# Patient Record
Sex: MALE | Race: WHITE | Employment: FULL TIME | URBAN - METROPOLITAN AREA
[De-identification: names, ages, dates, MRNs, and addresses within clinical notes are randomized per-mention and may not be internally consistent; named-entity substitution may affect disease eponyms.]

---

## 2018-03-05 ENCOUNTER — APPOINTMENT (OUTPATIENT)
Dept: CT IMAGING | Age: 63
DRG: 072 | End: 2018-03-05
Payer: COMMERCIAL

## 2018-03-05 ENCOUNTER — APPOINTMENT (OUTPATIENT)
Dept: MRI IMAGING | Age: 63
DRG: 072 | End: 2018-03-05
Payer: COMMERCIAL

## 2018-03-05 ENCOUNTER — HOSPITAL ENCOUNTER (INPATIENT)
Age: 63
LOS: 1 days | Discharge: HOME | DRG: 072 | End: 2018-03-06
Attending: EMERGENCY MEDICINE | Admitting: FAMILY MEDICINE
Payer: COMMERCIAL

## 2018-03-05 DIAGNOSIS — R41.2 AMNESIA (RETROGRADE): Primary | ICD-10-CM

## 2018-03-05 PROBLEM — G45.9 TIA (TRANSIENT ISCHEMIC ATTACK): Status: ACTIVE | Noted: 2018-03-05

## 2018-03-05 LAB
EKG ATRIAL RATE: 62 BPM
EKG P AXIS: 57 DEGREES
EKG P-R INTERVAL: 134 MS
EKG Q-T INTERVAL: 392 MS
EKG QRS DURATION: 100 MS
EKG QTC CALCULATION (BAZETT): 397 MS
EKG R AXIS: 32 DEGREES
EKG T AXIS: 19 DEGREES
EKG VENTRICULAR RATE: 62 BPM
TROPONIN INTERP: NORMAL
TROPONIN T: <0.03 NG/ML

## 2018-03-05 PROCEDURE — 70551 MRI BRAIN STEM W/O DYE: CPT

## 2018-03-05 PROCEDURE — 99254 IP/OBS CNSLTJ NEW/EST MOD 60: CPT | Performed by: PSYCHIATRY & NEUROLOGY

## 2018-03-05 PROCEDURE — 6360000004 HC RX CONTRAST MEDICATION: Performed by: EMERGENCY MEDICINE

## 2018-03-05 PROCEDURE — 84484 ASSAY OF TROPONIN QUANT: CPT

## 2018-03-05 PROCEDURE — 99285 EMERGENCY DEPT VISIT HI MDM: CPT

## 2018-03-05 PROCEDURE — 70498 CT ANGIOGRAPHY NECK: CPT

## 2018-03-05 PROCEDURE — 93005 ELECTROCARDIOGRAM TRACING: CPT

## 2018-03-05 PROCEDURE — 70496 CT ANGIOGRAPHY HEAD: CPT

## 2018-03-05 PROCEDURE — 2060000000 HC ICU INTERMEDIATE R&B

## 2018-03-05 RX ORDER — ASPIRIN 81 MG/1
81 TABLET ORAL DAILY
Status: DISCONTINUED | OUTPATIENT
Start: 2018-03-06 | End: 2018-03-06 | Stop reason: HOSPADM

## 2018-03-05 RX ORDER — ACETAMINOPHEN 325 MG/1
650 TABLET ORAL EVERY 4 HOURS PRN
Status: DISCONTINUED | OUTPATIENT
Start: 2018-03-05 | End: 2018-03-06 | Stop reason: HOSPADM

## 2018-03-05 RX ORDER — SODIUM CHLORIDE 0.9 % (FLUSH) 0.9 %
10 SYRINGE (ML) INJECTION EVERY 12 HOURS SCHEDULED
Status: DISCONTINUED | OUTPATIENT
Start: 2018-03-06 | End: 2018-03-06 | Stop reason: HOSPADM

## 2018-03-05 RX ORDER — BISACODYL 10 MG
10 SUPPOSITORY, RECTAL RECTAL DAILY PRN
Status: DISCONTINUED | OUTPATIENT
Start: 2018-03-05 | End: 2018-03-06 | Stop reason: HOSPADM

## 2018-03-05 RX ORDER — NICOTINE 21 MG/24HR
1 PATCH, TRANSDERMAL 24 HOURS TRANSDERMAL DAILY
Status: DISCONTINUED | OUTPATIENT
Start: 2018-03-06 | End: 2018-03-06 | Stop reason: HOSPADM

## 2018-03-05 RX ORDER — ASPIRIN 81 MG/1
324 TABLET, CHEWABLE ORAL ONCE
Status: COMPLETED | OUTPATIENT
Start: 2018-03-05 | End: 2018-03-06

## 2018-03-05 RX ORDER — ACETAMINOPHEN 650 MG/1
650 SUPPOSITORY RECTAL EVERY 4 HOURS PRN
Status: DISCONTINUED | OUTPATIENT
Start: 2018-03-05 | End: 2018-03-06 | Stop reason: HOSPADM

## 2018-03-05 RX ORDER — ONDANSETRON 2 MG/ML
4 INJECTION INTRAMUSCULAR; INTRAVENOUS EVERY 6 HOURS PRN
Status: DISCONTINUED | OUTPATIENT
Start: 2018-03-05 | End: 2018-03-06 | Stop reason: HOSPADM

## 2018-03-05 RX ORDER — HYDROCODONE BITARTRATE AND ACETAMINOPHEN 5; 325 MG/1; MG/1
1 TABLET ORAL EVERY 4 HOURS PRN
Status: DISCONTINUED | OUTPATIENT
Start: 2018-03-05 | End: 2018-03-06 | Stop reason: HOSPADM

## 2018-03-05 RX ORDER — ASPIRIN 300 MG/1
300 SUPPOSITORY RECTAL DAILY
Status: DISCONTINUED | OUTPATIENT
Start: 2018-03-06 | End: 2018-03-06 | Stop reason: HOSPADM

## 2018-03-05 RX ORDER — SODIUM CHLORIDE 0.9 % (FLUSH) 0.9 %
10 SYRINGE (ML) INJECTION PRN
Status: DISCONTINUED | OUTPATIENT
Start: 2018-03-05 | End: 2018-03-06 | Stop reason: HOSPADM

## 2018-03-05 RX ORDER — SODIUM CHLORIDE 9 MG/ML
INJECTION, SOLUTION INTRAVENOUS CONTINUOUS
Status: DISCONTINUED | OUTPATIENT
Start: 2018-03-06 | End: 2018-03-06 | Stop reason: HOSPADM

## 2018-03-05 RX ORDER — SIMVASTATIN 20 MG
20 TABLET ORAL NIGHTLY
Status: DISCONTINUED | OUTPATIENT
Start: 2018-03-06 | End: 2018-03-06 | Stop reason: HOSPADM

## 2018-03-05 RX ORDER — MORPHINE SULFATE 2 MG/ML
2 INJECTION, SOLUTION INTRAMUSCULAR; INTRAVENOUS
Status: DISCONTINUED | OUTPATIENT
Start: 2018-03-05 | End: 2018-03-06 | Stop reason: HOSPADM

## 2018-03-05 RX ORDER — MORPHINE SULFATE 4 MG/ML
4 INJECTION, SOLUTION INTRAMUSCULAR; INTRAVENOUS
Status: DISCONTINUED | OUTPATIENT
Start: 2018-03-05 | End: 2018-03-06 | Stop reason: HOSPADM

## 2018-03-05 RX ADMIN — IOPAMIDOL 90 ML: 755 INJECTION, SOLUTION INTRAVENOUS at 22:45

## 2018-03-06 VITALS
SYSTOLIC BLOOD PRESSURE: 119 MMHG | WEIGHT: 192.4 LBS | HEART RATE: 66 BPM | RESPIRATION RATE: 17 BRPM | OXYGEN SATURATION: 98 % | BODY MASS INDEX: 26.94 KG/M2 | HEIGHT: 71 IN | DIASTOLIC BLOOD PRESSURE: 67 MMHG | TEMPERATURE: 98.1 F

## 2018-03-06 PROBLEM — G45.4 TRANSIENT GLOBAL AMNESIA: Status: ACTIVE | Noted: 2018-03-05

## 2018-03-06 LAB
ALBUMIN SERPL-MCNC: 3.7 G/DL (ref 3.5–5.2)
ALBUMIN/GLOBULIN RATIO: 1.6 (ref 1–2.5)
ALP BLD-CCNC: 55 U/L (ref 40–129)
ALT SERPL-CCNC: 15 U/L (ref 5–41)
ANION GAP SERPL CALCULATED.3IONS-SCNC: 13 MMOL/L (ref 9–17)
AST SERPL-CCNC: 15 U/L
BILIRUB SERPL-MCNC: 1.09 MG/DL (ref 0.3–1.2)
BUN BLDV-MCNC: 15 MG/DL (ref 8–23)
BUN/CREAT BLD: ABNORMAL (ref 9–20)
CALCIUM SERPL-MCNC: 8.6 MG/DL (ref 8.6–10.4)
CHLORIDE BLD-SCNC: 105 MMOL/L (ref 98–107)
CHOLESTEROL/HDL RATIO: 4.2
CHOLESTEROL: 160 MG/DL
CO2: 24 MMOL/L (ref 20–31)
CREAT SERPL-MCNC: 0.71 MG/DL (ref 0.7–1.2)
ESTIMATED AVERAGE GLUCOSE: 103 MG/DL
GFR AFRICAN AMERICAN: >60 ML/MIN
GFR NON-AFRICAN AMERICAN: >60 ML/MIN
GFR SERPL CREATININE-BSD FRML MDRD: ABNORMAL ML/MIN/{1.73_M2}
GFR SERPL CREATININE-BSD FRML MDRD: ABNORMAL ML/MIN/{1.73_M2}
GLUCOSE BLD-MCNC: 81 MG/DL (ref 70–99)
HBA1C MFR BLD: 5.2 % (ref 4–6)
HCT VFR BLD CALC: 40.8 % (ref 40.7–50.3)
HDLC SERPL-MCNC: 38 MG/DL
HEMOGLOBIN: 13.7 G/DL (ref 13–17)
LDL CHOLESTEROL: 109 MG/DL (ref 0–130)
LV EF: 55 %
LVEF MODALITY: NORMAL
MCH RBC QN AUTO: 30.6 PG (ref 25.2–33.5)
MCHC RBC AUTO-ENTMCNC: 33.6 G/DL (ref 28.4–34.8)
MCV RBC AUTO: 91.3 FL (ref 82.6–102.9)
NRBC AUTOMATED: 0 PER 100 WBC
PDW BLD-RTO: 13.8 % (ref 11.8–14.4)
PLATELET # BLD: 135 K/UL (ref 138–453)
PMV BLD AUTO: 10.1 FL (ref 8.1–13.5)
POTASSIUM SERPL-SCNC: 3.9 MMOL/L (ref 3.7–5.3)
RBC # BLD: 4.47 M/UL (ref 4.21–5.77)
SODIUM BLD-SCNC: 142 MMOL/L (ref 135–144)
TOTAL PROTEIN: 6 G/DL (ref 6.4–8.3)
TRIGL SERPL-MCNC: 67 MG/DL
VLDLC SERPL CALC-MCNC: ABNORMAL MG/DL (ref 1–30)
WBC # BLD: 6.5 K/UL (ref 3.5–11.3)

## 2018-03-06 PROCEDURE — 97162 PT EVAL MOD COMPLEX 30 MIN: CPT

## 2018-03-06 PROCEDURE — 99254 IP/OBS CNSLTJ NEW/EST MOD 60: CPT | Performed by: PSYCHIATRY & NEUROLOGY

## 2018-03-06 PROCEDURE — 6370000000 HC RX 637 (ALT 250 FOR IP): Performed by: EMERGENCY MEDICINE

## 2018-03-06 PROCEDURE — 85027 COMPLETE CBC AUTOMATED: CPT

## 2018-03-06 PROCEDURE — 80053 COMPREHEN METABOLIC PANEL: CPT

## 2018-03-06 PROCEDURE — 97165 OT EVAL LOW COMPLEX 30 MIN: CPT

## 2018-03-06 PROCEDURE — 2580000003 HC RX 258: Performed by: NURSE PRACTITIONER

## 2018-03-06 PROCEDURE — 6360000002 HC RX W HCPCS: Performed by: NURSE PRACTITIONER

## 2018-03-06 PROCEDURE — 94762 N-INVAS EAR/PLS OXIMTRY CONT: CPT

## 2018-03-06 PROCEDURE — 95819 EEG AWAKE AND ASLEEP: CPT

## 2018-03-06 PROCEDURE — 97535 SELF CARE MNGMENT TRAINING: CPT

## 2018-03-06 PROCEDURE — G8987 SELF CARE CURRENT STATUS: HCPCS

## 2018-03-06 PROCEDURE — 97530 THERAPEUTIC ACTIVITIES: CPT

## 2018-03-06 PROCEDURE — G8979 MOBILITY GOAL STATUS: HCPCS

## 2018-03-06 PROCEDURE — 80061 LIPID PANEL: CPT

## 2018-03-06 PROCEDURE — 36415 COLL VENOUS BLD VENIPUNCTURE: CPT

## 2018-03-06 PROCEDURE — 95819 EEG AWAKE AND ASLEEP: CPT | Performed by: PSYCHIATRY & NEUROLOGY

## 2018-03-06 PROCEDURE — G8980 MOBILITY D/C STATUS: HCPCS

## 2018-03-06 PROCEDURE — 6370000000 HC RX 637 (ALT 250 FOR IP): Performed by: NURSE PRACTITIONER

## 2018-03-06 PROCEDURE — 93306 TTE W/DOPPLER COMPLETE: CPT

## 2018-03-06 PROCEDURE — G8988 SELF CARE GOAL STATUS: HCPCS

## 2018-03-06 PROCEDURE — 83036 HEMOGLOBIN GLYCOSYLATED A1C: CPT

## 2018-03-06 PROCEDURE — 99222 1ST HOSP IP/OBS MODERATE 55: CPT | Performed by: FAMILY MEDICINE

## 2018-03-06 PROCEDURE — G8978 MOBILITY CURRENT STATUS: HCPCS

## 2018-03-06 PROCEDURE — G8989 SELF CARE D/C STATUS: HCPCS

## 2018-03-06 RX ORDER — ASPIRIN 81 MG/1
81 TABLET ORAL DAILY
Qty: 30 TABLET | Refills: 3 | Status: SHIPPED | OUTPATIENT
Start: 2018-03-07

## 2018-03-06 RX ORDER — SIMVASTATIN 20 MG
20 TABLET ORAL NIGHTLY
Qty: 30 TABLET | Refills: 3 | Status: SHIPPED | OUTPATIENT
Start: 2018-03-06

## 2018-03-06 RX ADMIN — ENOXAPARIN SODIUM 40 MG: 100 INJECTION SUBCUTANEOUS at 08:39

## 2018-03-06 RX ADMIN — Medication 81 MG: at 08:39

## 2018-03-06 RX ADMIN — SODIUM CHLORIDE: 9 INJECTION, SOLUTION INTRAVENOUS at 01:20

## 2018-03-06 RX ADMIN — ASPIRIN 81 MG 324 MG: 81 TABLET ORAL at 01:18

## 2018-03-06 RX ADMIN — SIMVASTATIN 20 MG: 20 TABLET, FILM COATED ORAL at 01:17

## 2018-03-06 ASSESSMENT — ENCOUNTER SYMPTOMS
WHEEZING: 0
ABDOMINAL PAIN: 0
BACK PAIN: 0
NAUSEA: 0
CONSTIPATION: 0
SHORTNESS OF BREATH: 0
DIARRHEA: 0
COLOR CHANGE: 0
BLOOD IN STOOL: 0
VOICE CHANGE: 0
VOMITING: 0
COUGH: 0
CHEST TIGHTNESS: 0
SINUS PRESSURE: 0
SORE THROAT: 0

## 2018-03-06 NOTE — H&P
reports that he does not use drugs. Family History:     History reviewed. No pertinent family history. Review of Systems:     Positive and Negative as described in HPI. Review of Systems   Constitutional: Negative for activity change, appetite change, chills, fatigue, fever and unexpected weight change. HENT: Negative for congestion, mouth sores, postnasal drip, sinus pressure, sore throat and voice change. Eyes: Negative for visual disturbance. Respiratory: Negative for cough, shortness of breath and wheezing. Cardiovascular: Negative for chest pain and palpitations. Gastrointestinal: Negative for abdominal pain, blood in stool, constipation, diarrhea, nausea and vomiting. Endocrine: Negative for polyuria. Genitourinary: Negative for difficulty urinating, dysuria, frequency and urgency. Musculoskeletal: Negative for arthralgias, joint swelling and myalgias. Neurological: Negative for dizziness, tremors, speech difficulty, light-headedness and headaches. Physical Exam:   /78   Pulse 60   Temp 98 °F (36.7 °C) (Oral)   Resp 17   Ht 5' 11\" (1.803 m)   Wt 192 lb 6.4 oz (87.3 kg)   SpO2 97%   BMI 26.83 kg/m²   Temp (24hrs), Av.9 °F (36.6 °C), Min:97.6 °F (36.4 °C), Max:98.5 °F (36.9 °C)    No results for input(s): POCGLU in the last 72 hours. Intake/Output Summary (Last 24 hours) at 18 1147  Last data filed at 18 1974   Gross per 24 hour   Intake            822.6 ml   Output                0 ml   Net            822.6 ml     Physical Exam   Constitutional: He is oriented to person, place, and time. He appears well-developed and well-nourished. No distress. HENT:   Mouth/Throat: Oropharynx is clear and moist. No oropharyngeal exudate. Eyes: Pupils are equal, round, and reactive to light. No scleral icterus. Neck: Neck supple. No JVD present. No tracheal deviation present. No thyromegaly present.    Cardiovascular: Normal rate, regular rhythm, normal >60 >60 mL/min    GFR Comment          GFR Staging NOT REPORTED    Hemoglobin A1c    Collection Time: 03/06/18  6:21 AM   Result Value Ref Range    Hemoglobin A1C 5.2 4.0 - 6.0 %    Estimated Avg Glucose 103 mg/dL   Lipid panel - fasting    Collection Time: 03/06/18  6:21 AM   Result Value Ref Range    Cholesterol 160 <200 mg/dL    HDL 38 (L) >40 mg/dL    LDL Cholesterol 109 0 - 130 mg/dL    Chol/HDL Ratio 4.2 <5    Triglycerides 67 <150 mg/dL    VLDL NOT REPORTED 1 - 30 mg/dL   CBC    Collection Time: 03/06/18  6:21 AM   Result Value Ref Range    WBC 6.5 3.5 - 11.3 k/uL    RBC 4.47 4.21 - 5.77 m/uL    Hemoglobin 13.7 13.0 - 17.0 g/dL    Hematocrit 40.8 40.7 - 50.3 %    MCV 91.3 82.6 - 102.9 fL    MCH 30.6 25.2 - 33.5 pg    MCHC 33.6 28.4 - 34.8 g/dL    RDW 13.8 11.8 - 14.4 %    Platelets 250 (L) 000 - 453 k/uL    MPV 10.1 8.1 - 13.5 fL    NRBC Automated 0.0 0.0 per 100 WBC       Imaging/Diagonstics:    MRI brain 3/5/18   Impression:    Normal MRI of the brain without contrast.    CTA head and neck 3/5/18 Impression: Atherosclerotic changes without hemodynamically significant stenosis in the  major cervical arterial vasculature. Patent intracranial arterial vasculature. Assessment :      Primary Problem  TIA (transient ischemic attack)    Active Hospital Problems    Diagnosis Date Noted    TIA (transient ischemic attack) [G45.9] 03/05/2018       Plan:     Patient status Admit as inpatient in the  Progressive Unit/Step down    1. Amnesia - likely TIA Vs seizure . Brain imaging normal - needs EEG , Risk stratification , check lipid , A1C .          Consultations:   IP CONSULT TO STROKE TEAM  IP CONSULT TO HOSPITALIST  IP CONSULT TO NEUROLOGY     Patient is admitted as inpatient status because of co-morbidities listed above, severity of signs and symptoms as outlined, requirement for current medical therapies and most importantly because of direct risk to patient if care not provided in a hospital setting.     John Farmer MD  3/6/2018    Copy sent to Dr. Bowen Berry MD    (Please note that portions of this note were completed with a voice recognition program. Efforts were made to edit the dictations but occasionally words are mis-transcribed.)

## 2018-03-06 NOTE — PROCEDURES
[address] [facility] [place], Daniel Lopezt, [facility]      ELECTROENCEPHALOGRAM REPORT        REFERRING PHYSICIAN:  Dr. Kirstin Gauthier MRN: [id number]  DATE OF EEG: 3/6/2018    BRIEF HISTORY:  65year old RH  was admitted for confusion. CURRENT ANTI-EPILEPTIC MEDICATIONS: None    EEG DESCRIPTION:   During maximal wakefulness, the background was well organized and continuous consisting of an admixture of frequency (alpha, beta and theta) ranging between 10-15uV. There was a posterior dominant alpha rhythm at 9-10 Hz, which was symmetric and reactive to eye opening/eye closure. Low amplitude 13-18 Hz beta rhythms were seen symmetrically over the frontal-central head regions. Spontaneous variability to stimulation were present. No persistent focal asymmetries or abnormalities were seen. No epileptiform discharges were recorded. No clinical or electrographic seizures were recorded. Stage I sleep were recorded with alpha dropout, slow rolling eye movements, and high voltage centrally predominant vertex waves. Stage II sleep was recorded with centrally predominant, bilateral and symmetric K complexes and sleep spindles at 14-16Hz. Hyperventilation was not performed, photic stimulation did not activate abnormal activity. Heart rate was regular at 50s-60s  beats per minute on a single lead EKG. CLASSIFICATION:  Normal (Awake, asleep)    IMPRESSION:  This was a normal routine awake and asleep EEG. There is no evidence to support a diagnosis of seizures on this recording.      Ulysses Hy, MD, [address] [place] [address], Neurology

## 2018-03-06 NOTE — ED PROVIDER NOTES
Musculoskeletal: He exhibits no tenderness. Neurological: He is alert. No cranial nerve deficit. AAOx3. 5/5 motor strength bilateral upper/lower extremities. Sensation preserved/intact bilateral upper/lower extremities. EOMI, no disconjugate gaze, PERRL; facial musculature, tone and sensation intact bilaterally; uvula elevates in midline; SCM strength intact bilaterally; no dysmetria; no dysdiadochokinesia      NIH 0   Skin: Skin is warm and dry. No erythema. DIFFERENTIAL  DIAGNOSIS     PLAN (LABS / IMAGING / EKG):  Orders Placed This Encounter   Procedures    MRI BRAIN WO CONTRAST    CTA HEAD W CONTRAST    CTA NECK W CONTRAST    Troponin    Comprehensive Metabolic Panel w/ Reflex to MG    Hemoglobin A1c    Lipid panel - fasting    CBC    DIET CARDIAC;    Vital signs    Telemetry monitoring    Up with assistance    Daily weights    Intake and output    Neuro checks    NIHSS    Tobacco cessation education protocol    Swallow assessment by nursing before diet and oral medications started.     Stroke education    Place intermittent pneumatic compression device    Notify Physician    Full Code    Inpatient consult to Stroke Team    Inpatient consult to Hospitalist    Consult to Neurology    OT eval and treat    PT evaluation and treat    Initiate Oxygen Therapy Protocol    Pulse Oximetry Spot Check    Pulse oximetry, continuous    EKG 12 Lead    Echocardiogram complete 2D with doppler with color    PATIENT STATUS (FROM ED OR OR/PROCEDURAL) Inpatient       MEDICATIONS ORDERED:  Orders Placed This Encounter   Medications    iopamidol (ISOVUE-370) 76 % injection 90 mL    0.9 % sodium chloride infusion    sodium chloride flush 0.9 % injection 10 mL    sodium chloride flush 0.9 % injection 10 mL    acetaminophen (TYLENOL) tablet 650 mg    acetaminophen (TYLENOL) suppository 650 mg    HYDROcodone-acetaminophen (NORCO) 5-325 MG per tablet 1 tablet    OR Linked Order Group of the mA/kV was utilized to reduce the radiation dose to as low as reasonably achievable. COMPARISON: None. HISTORY: ORDERING SYSTEM PROVIDED HISTORY: concern for TIA; ORDERING SYSTEM PROVIDED HISTORY: Concern for TIA FINDINGS: CTA NECK: AORTIC ARCH/ARCH VESSELS: There is a normal branch pattern of the aortic arch. No significant stenosis is seen of the innominate artery or subclavian arteries. CAROTID ARTERIES: The common carotid arteries are normal in appearance without evidence of a flow limiting stenosis. There are atherosclerotic changes at the carotid bulbs. The internal carotid arteries are otherwise normal in appearance without evidence of a flow limiting stenosis by NASCET criteria. No dissection or arterial injury is seen. VERTEBRAL ARTERIES: The vertebral arteries both arise from the subclavian arteries and are normal in caliber without evidence of flow limiting stenosis or dissection. SOFT TISSUES: The lung apices are clear. No cervical or superior mediastinal lymphadenopathy. The visualized portion of the larynx and pharynx appear unremarkable. The parotid, submandibular and thyroid glands demonstrate no acute abnormality. BONES: The visualized osseous structures appear unremarkable. CTA HEAD: ANTERIOR CIRCULATION: The internal carotid arteries are normal in course and caliber without focal stenosis. The anterior cerebral and middle cerebral arteries demonstrate no focal stenosis. POSTERIOR CIRCULATION: The posterior cerebral arteries demonstrate no focal stenosis. The vertebral and basilar arteries appear unremarkable. BRAIN: No mass effect or midline shift. No abnormal extra-axial fluid collection. The gray-white differentiation appears grossly maintained. Atherosclerotic changes without hemodynamically significant stenosis in the major cervical arterial vasculature. Patent intracranial arterial vasculature.      Cta Head W Contrast    Result Date: 3/5/2018  EXAMINATION: CTA OF THE HEAD WITH

## 2018-03-06 NOTE — DISCHARGE SUMMARY
normal lipid panel, diabetes negative. Aspirin 81, Zocor 20. Follow outpatient with neurology. Significant Diagnostic Studies:   Labs / Micro:/Radiology  Recent Labs      03/06/18   0621   WBC  6.5   HGB  13.7   HCT  40.8   MCV  91.3   PLT  135*     Labs Renal Latest Ref Rng & Units 3/6/2018   BUN 8 - 23 mg/dL 15   Cr 0.70 - 1.20 mg/dL 0.71   K 3.7 - 5.3 mmol/L 3.9   Na 135 - 144 mmol/L 142     Lab Results   Component Value Date    ALT 15 03/06/2018    AST 15 03/06/2018    ALKPHOS 55 03/06/2018    BILITOT 1.09 03/06/2018     No results found for: TSHFT4, TSH  No results found for: HAV, HEPAIGM, HEPBIGM, HEPBCAB, HBEAG, HEPCAB  No results found for: VOL, APPEARANCE, COLORU, LABSPEC, LABPH, LEUKBLD, NITRU, GLUCOSEU, KETUA, UROBILINOGEN, KETUA, UROBILINOGEN, BILIRUBINUR, OCBU  Lab Results   Component Value Date    LABA1C 5.2 03/06/2018     Imaging/Diagonstics:     MRI brain 3/5/18   Impression:    Normal MRI of the brain without contrast.     CTA head and neck 3/5/18 Impression:    Atherosclerotic changes without hemodynamically significant stenosis in the  major cervical arterial vasculature. Patent intracranial arterial vasculature. Echocardiogram 3/6/18  Summary  Normal left ventricular ejection fraction (>55%). Left atrium is mildly dilated. Right atrium is mildly dilated . Mild right ventricular dilatation with normal systolic function. Mild tricuspid regurgitation. EEG  3/6/18  IMPRESSION:  This was a normal routine awake and asleep EEG. There is no   evidence to support a diagnosis of seizures on this recording. Consultations:    Consults:     Final Specialist Recommendations/Findings:   IP CONSULT TO STROKE TEAM  IP CONSULT TO HOSPITALIST  IP CONSULT TO NEUROLOGY      The patient was seen and examined on day of discharge and this discharge summary is in conjunction with any daily progress note from day of discharge.     Discharge plan:     Disposition: Home    Physician Follow Up:     Saolni Moise Queen Ewa MD  [address]  [phone]           Follow-up with neurology  Dr Kimani French       Requiring Further Evaluation/Follow Up POST HOSPITALIZATION/Incidental Findings: Follow-up neurology as outpatient. Diet: regular diet    Activity: As tolerated    Instructions to Patient: Medication as advised. Discharge Medications:      Medication List      START taking these medications    aspirin 81 MG EC tablet  Take 1 tablet by mouth daily  Start taking on:  3/7/2018     simvastatin 20 MG tablet  Commonly known as:  ZOCOR  Take 1 tablet by mouth nightly           Where to Get Your Medications      These medications were sent to [address] [place] [address] Rd, [address] [facility]  [facility], [address] [place] [id number]    Phone:  [phone]   · aspirin 81 MG EC tablet  · simvastatin 20 MG tablet         Time Spent on discharge is  38 mins in patient examination, evaluation, counseling as well as medication reconciliation, prescriptions for required medications, discharge plan and follow up. Electronically signed by   Benny Shetty MD  3/6/2018        Thank you Dr. Melissa MD for the opportunity to be involved in this patient's care.

## 2018-03-06 NOTE — PROGRESS NOTES
Occupational Therapy   Occupational Therapy Initial Assessment  Date: 3/6/2018   Patient Name: Sonali Santana  MRN: [id number]     : 1955    Patient Diagnosis(es): The encounter diagnosis was Amnesia (retrograde). Copied from Emergency Medicine: Sonali Santana is a 65 y.o. male who presents with An episode of amnesia. Patient says that the last thing he remembers is being in his office, after that the only other thing he remembers is waking up in the back of his coworkers truck being taken to the hospital. Patient at this time denies any chest pain, shortness of breath, abdominal pain, nausea, vomiting, denies any numbness, weakness. Denies any headaches as well. Patient was taken initially to [facility] where he had a extended workup done. Patient had a CT head that was negative, CT chest to rule out a PE as well. Patient EKG was unremarkable and first troponin enzymes was negative as well. At the outside hospital facility there was concern for TIA, the case was discussed with Dr. Ean Nugent and patient was arranged to be transferred here to see Dr. Ean Nugent on the stroke team.  Patient denies any headaches, numbness, weakness, dysphagia, facial droop, difficulty speaking. Patient is at his baseline at this time. Patient says that his boss told him that he had a conversation with him, and that the patient had told his boss that he does not feel good. Patient does not remember any of this. No actual syncope episode that patient knows of.     has no past medical history on file. has no past surgical history on file. Restrictions  Restrictions/Precautions  Required Braces or Orthoses?: No    Subjective   General  Patient assessed for rehabilitation services?: Yes  Family / Caregiver Present: Yes (Wife and youngest son.)  General Comment  Comments: VANESSA Xavier ok'd for OT eval/tx.  Pt agreeable/cooperative; up with assistance  Pain Assessment  Patient Currently in Pain: Denies

## 2018-03-06 NOTE — PROGRESS NOTES
Smoking Cessation - topics covered   []  Health Risks  []  Benefits of Quitting   []  Smoking Cessation  [x]  Patient has no history of tobacco use  []  Patient is former smoker. [x]  No need for tobacco cessation education. []  Booklet given  []  Patient verbalizes understanding. []  Patient denies need for tobacco cessation education.   Missy Brush  7:23 AM

## 2018-03-06 NOTE — PROGRESS NOTES
Pt discharged with all belongings including medication and follow up information. Pt has no questions at this time.

## 2018-03-06 NOTE — ED NOTES
Report called to TERENCE Mora.  Pt to be transported after [address] [place] [address], [address] [place] [address]  03/05/18 0954

## 2018-03-06 NOTE — PLAN OF CARE
Problem: Falls - Risk of:  Goal: Will remain free from falls  Will remain free from falls  Outcome: Ongoing  Pt remains free from falls at this time. Floor free from obstacles, and bed is locked and in lowest position. Adequate lighting provided. Call light within reach; pt encouraged to call before getting OOB for any need. Will continue to monitor needs during hourly rounding.

## 2018-03-06 NOTE — ED NOTES
[facility] [address] [place] [address]  03/05/18 1997

## 2018-03-06 NOTE — CONSULTS
NEUROLOGY INPATIENT CONSULT NOTE    3/6/2018         Shari Duke is a  65 y.o. male admitted on 3/5/2018 with  TIA (transient ischemic attack) [G45.9]      History is obtained mostly from the patient and the medical record and from the caregivers. Chart is reviewed and patient is examined. Briefly, this is a  65 y. o.right handed  male with no significant PMH was admitted as a transfer from [facility] ED on 3/5/2018 with acute onset of confusion. Patient stated that he was in his usual state of health until yesterday morning. He went to work at about 8:00 and he had breakfast or lunch at about 10 AM or lso and the next thing he remembered was waking up in coworkers truck being taken to Hospital.  Patient does not recall things occurred for a few hours yesterday stating \"from late morning until after known, I do not know what happened\". He did not lose consciousness. Patient was taken to [facility] ED where he had CT head and it was negative. As patient continued to remain confused; he was transferred to [place] for further evaluation. Vitals on admit to ED: 125/96, 65/min, 98.5°F.  Patient was evaluated by stroke team on admit. CTA head and neck was unremarkable. He was started on aspirin 325 mg once daily. As per patient's wife; patient's boss said the patient came to his office climbing he wasn't feeling well. His coworker found him very confused. Patient doesn't remember having any of those conversations. Patient denied history of CVA or TIA. He was never on any antiplatelets or lipid-lowering agents. Denies headaches, dizziness and vision changes. Denies speech and swallowing difficulties. Denies numbness and weakness. Denies balance difficulties and falls. Denies recent febrile illnesses. No current facility-administered medications on file prior to encounter. No current outpatient prescriptions on file prior to encounter.      Allergies: NSR.          Impression and Plan: Mr. Ulysses Sprague is a 65 y.o. male with   Transient global amnesia; CT head, CTA head and neck, MRI brain images and findings are reviewed with the patient and his family members at bedside. Electrolytes on admit were unremarkable. Will get Toxicology panel, EEG today in further evaluation. Possible differentials of TGA were discussed with the patient's family members at bedside and they verbalized understanding those instructions. Care plan was discussed with patient and his family members at bedside and Nurse. Will follow with you. Thank you for consultation.

## 2018-03-06 NOTE — CARE COORDINATION
Case Management Initial Discharge Plan  [facility],         Readmission Risk              Readmission Risk:        1.25       Age 72 or Greater:  0    Admitted from SNF or Requires Paid or Family Care:  0    Currently has CHF,COPD,ARF,CRI,or is on dialysis:  0    Takes more than 5 Prescription Medications:  0    Takes Digoxin,Insulin,Anticoagulants,Narcotics or ASA/Plavix:  [address] in Past 12 Months:  0    On Disability:  0    Patient Considers own Health:  1.25            Met with:patient to discuss discharge plans. Information verified: address, contacts, phone number, , insurance Yes  PCP: No primary care provider on file. Date of last visit: New PCP appt was for today    Insurance Provider: Indira    Discharge Planning  Current Residence:  Private Residence  Living Arrangements:  Spouse/Significant Other   Home has 2 stories/ 2 flights stairs to climb, uses basement at times  Support Systems:  Spouse/Significant Other  Current Services PTA:  None Supplier: na  Patient able to perform ADL's:Independent  DME used to aid ambulation prior to admission: none /during admission TBD    Potential Assistance Needed:  N/A    Pharmacy: [facility],    Potential Assistance Purchasing Medications:  No  Does patient want to participate in local refill/ meds to beds program?  Yes    Patient agreeable to home care: No  [place] of choice provided:  n/a      Type of Home Care Services:  None  Patient expects to be discharged to:  3/8/2018    Prior SNF/Rehab Placement and Facility: no  Agreeable to SNF/Rehab: No  [place] of choice provided: n/a   Evaluation: n/a    Expected Discharge date:  18  Follow Up Appointment: Best Day/ Time: Tuesday PM    Transportation provider: Amos Quinones  Transportation arrangements needed for discharge: No     Discharge Plan: met with patient about discharge planning. Currently working and drives self.  Had episode at work that he cannot remember what happened for

## 2018-03-06 NOTE — FLOWSHEET NOTE
[facility] CARE DEPARTMENT - [facility]     Emergency/Trauma Note    PATIENT NAME: Rochelle Smyth    Shift date: 3.5.2018  Shift day: Monday   Shift # 2    Room # 15/15   Name: Rochelle Smyth            Age: 65 y.o. Gender: male          [employer]yb[employer]ian: Zayda Bernard 34 of [employer]:     Trauma/Incident type: Stroke Alert  Admit Date & Time: 3/5/2018  9:12 PM  TRAUMA NAME:         PATIENT/EVENT DESCRIPTION:  Rochelle Smyth is a 65 y.o. male who arrived at ED via [id number] [place] [address] as a transfer from [facility]. Pt was taken by private auto to [facility] today for an episode of amnesia. Pt states he was at work when he suddenly forgot the events of his morning. Pt to be admitted to 15/15. SPIRITUAL ASSESSMENT/INTERVENTION:   met with Pt in ED room 15. Pt states he is feeling fine. He was at work today and his co-worker stated he begin to act strange and they had him talk to the EMS. Pt states he remembers going to work but cant remember much after that. He cant recall what happened. Pt states his wife is aware of his medical condition, and he had her stay at home because he dont want her driving at night. Pt states hes Buddhism.  was a listening presence,  shared words of comfort and blessings, and prayed for patient. PATIENT BELONGINGS:  With patient    ANY BELONGINGS OF SIGNIFICANT VALUE NOTED:  N/A    REGISTRATION STAFF NOTIFIED? Yes      WHAT IS YOUR SPIRITUAL CARE PLAN FOR THIS PATIENT?:   Perla Davies will remain available to offer spiritual and emotional support as needed.      Electronically signed by Chaplain Alondra, on 3/5/2018 at 11:14 PM.

## 2018-03-06 NOTE — PROGRESS NOTES
Physical Therapy    Facility/Department: [facility] 4B STEPDOWN  Initial Assessment    NAME: Negrito Turner  : 1955  MRN: [id number]    Date of Service: 3/6/2018   Judy Points a 65 y. o. male who presents with An episode of amnesia. Landon Gregg says that the last thing he remembers is being in his office, after that the only other thing he remembers is waking up in the back of his coworkers truck being taken to the hospital. Patient at this time denies any chest pain, shortness of breath, abdominal pain, nausea, vomiting, denies any numbness, weakness.  Denies any headaches as well.  Patient was taken initially to [facility] where he had a extended workup done. Landon Gregg had a CT head that was negative, CT chest to rule out a PE as well.  Patient EKG was unremarkable and first troponin enzymes was negative as well.  At the outside hospital facility there was concern for TIA, the case was discussed with Dr. Destinee Urias and patient was arranged to be transferred here to see Dr. Destinee Urias on the stroke team. Landon Gregg denies any headaches, numbness, weakness, dysphagia, facial droop, difficulty speaking. Landon Gregg is at his baseline at this time. Landon Gregg says that his boss told him that he had a conversation with him, and that the patient had told his boss that he does not feel good.  Patient does not remember any of this.  No actual syncope episode that patient knows of. Patient Diagnosis(es): The encounter diagnosis was Amnesia (retrograde). has no past medical history on file. has no past surgical history on file.     Restrictions  Restrictions/Precautions  Required Braces or Orthoses?: No  Position Activity Restriction  Other position/activity restrictions: up with assist  Vision/Hearing  Vision: Impaired  Vision Exceptions: Wears glasses at all times  Hearing: Within functional limits     Subjective  General  Patient assessed for rehabilitation services?: Yes  Response To Previous Treatment: Not applicable  Family / Caregiver Present: No  Follows Commands: Within Functional Limits  Pain Screening  Patient Currently in Pain: Denies  Vital Signs  Patient Currently in Pain: Denies       Orientation  Orientation  Overall Orientation Status: Within Normal Limits    Social/Functional History  Social/Functional History  Lives With: Spouse  Type of Home: House  Home Layout: Two level, Laundry in basement, 1/2 bath on main level, Bed/Bath upstairs  Home Access: Stairs to enter with rails  Entrance Stairs - Number of Steps: 3-4  Bathroom Shower/Tub: Walk-in shower  Bathroom Toilet: Standard  Bathroom Accessibility: Accessible  Receives Help From: Family  ADL Assistance: Independent  Homemaking Assistance: Independent  Homemaking Responsibilities: Yes  Meal Prep Responsibility: Secondary (Pt likes to grill and will cook when wife does not.)  Laundry Responsibility: No (Wife completes)  Cleaning Responsibility: Secondary (Assist wife as necessary.)  Shopping Responsibility: Primary  Ambulation Assistance: Independent  Transfer Assistance: Independent  Active : Yes  Mode of Transportation: Truck  Occupation: Full time employment  Type of occupation: Manufacturing   Leisure & Hobbies: Hunting, being outdoors, restoring furniture   Objective          AROM RLE (degrees)  RLE AROM: WNL  AROM LLE (degrees)  LLE AROM : WNL  AROM RUE (degrees)  RUE AROM : WNL  AROM LUE (degrees)  LUE AROM : WNL  Strength RLE  Strength RLE: WNL  Strength LLE  Strength LLE: WNL  Strength RUE  Strength RUE: WNL  Strength LUE  Strength LUE: WNL     Sensation  Overall Sensation Status: WFL  Bed mobility  Rolling to Left: Stand by assistance  Rolling to Right: Stand by assistance  Supine to Sit: Stand by assistance  Sit to Supine: Stand by assistance  Scooting: Stand by assistance  Transfers  Sit to Stand: Stand by assistance  Stand to sit: Stand by assistance  Ambulation  Ambulation?: Yes  Ambulation 1  Surface: level tile  Device: No Device  Assistance: Stand by assistance  Distance: amb 175 ft x 2 without a device x SBA   up and down 4 steps with SBA  Balance  Posture: Good  Sitting - Static: Good  Sitting - Dynamic: Good  Standing - Static: Good  Standing - Dynamic: Good        Assessment   Assessment: The pt ambulated safely without a device  Prognosis: Good  Decision Making: Medium Complexity  Patient Education: PT  POC, Goals  REQUIRES PT FOLLOW UP: No  Activity Tolerance  Activity Tolerance: Patient Tolerated treatment well     Discharge Recommendations:         Plan   Plan  Times per week: DC  PT    G-Code  PT G-Codes  Functional Assessment Tool Used: [place] AM-PAC  Score: 20  Functional Limitation: Mobility: Walking and moving around  Mobility: Walking and Moving Around Current Status (): 0 percent impaired, limited or restricted  Mobility: Walking and Moving Around Goal Status (): 0 percent impaired, limited or restricted  Mobility: Walking and Moving Around Discharge Status (): 0 percent impaired, limited or restricted  OutComes Score                                           AM-PAC Score     AM-PAC Inpatient Mobility without Stair Climbing Raw Score : 20  AM-PAC Inpatient without Stair Climbing T-Scale Score : 60.57  Mobility Inpatient CMS 0-100% Score: 0  Mobility Inpatient without Stair CMS G-Code Modifier : CH       Goals  Short term goals  Time Frame for Short term goals: No PT nneds at this time  DC  PT       Therapy Time   Individual Concurrent Group Co-treatment   Time In 1005         Time Out 1031         Minutes Aj Villegas PT

## 2018-04-19 ENCOUNTER — OFFICE VISIT (OUTPATIENT)
Dept: NEUROLOGY | Age: 63
End: 2018-04-19
Payer: COMMERCIAL

## 2018-04-19 VITALS
HEIGHT: 71 IN | BODY MASS INDEX: 27.13 KG/M2 | WEIGHT: 193.8 LBS | SYSTOLIC BLOOD PRESSURE: 128 MMHG | HEART RATE: 64 BPM | DIASTOLIC BLOOD PRESSURE: 82 MMHG

## 2018-04-19 DIAGNOSIS — G45.4 TRANSIENT GLOBAL AMNESIA: Primary | ICD-10-CM

## 2018-04-19 PROCEDURE — 99214 OFFICE O/P EST MOD 30 MIN: CPT | Performed by: NURSE PRACTITIONER

## 2018-06-19 ENCOUNTER — OFFICE VISIT (OUTPATIENT)
Dept: FAMILY MEDICINE CLINIC | Age: 63
End: 2018-06-19
Payer: COMMERCIAL

## 2018-06-19 VITALS
WEIGHT: 189 LBS | DIASTOLIC BLOOD PRESSURE: 88 MMHG | SYSTOLIC BLOOD PRESSURE: 124 MMHG | HEART RATE: 64 BPM | BODY MASS INDEX: 26.36 KG/M2

## 2018-06-19 DIAGNOSIS — Z11.59 NEED FOR HEPATITIS C SCREENING TEST: ICD-10-CM

## 2018-06-19 DIAGNOSIS — G45.4 TRANSIENT GLOBAL AMNESIA: ICD-10-CM

## 2018-06-19 DIAGNOSIS — R22.32 MASS OF SKIN OF LEFT SHOULDER: ICD-10-CM

## 2018-06-19 DIAGNOSIS — Z00.00 EXAMINATION, MEDICAL, GENERAL: Primary | ICD-10-CM

## 2018-06-19 PROCEDURE — 99396 PREV VISIT EST AGE 40-64: CPT | Performed by: FAMILY MEDICINE

## 2018-06-19 ASSESSMENT — PATIENT HEALTH QUESTIONNAIRE - PHQ9
2. FEELING DOWN, DEPRESSED OR HOPELESS: 0
1. LITTLE INTEREST OR PLEASURE IN DOING THINGS: 0
SUM OF ALL RESPONSES TO PHQ QUESTIONS 1-9: 0
SUM OF ALL RESPONSES TO PHQ9 QUESTIONS 1 & 2: 0

## 2018-06-20 ENCOUNTER — TELEPHONE (OUTPATIENT)
Dept: FAMILY MEDICINE CLINIC | Age: 63
End: 2018-06-20

## 2018-06-21 DIAGNOSIS — R22.32 MASS OF SKIN OF LEFT SHOULDER: Primary | ICD-10-CM

## 2018-06-21 ASSESSMENT — ENCOUNTER SYMPTOMS
CHEST TIGHTNESS: 0
WHEEZING: 0
BACK PAIN: 0
SORE THROAT: 0
SHORTNESS OF BREATH: 0
ABDOMINAL DISTENTION: 0
ABDOMINAL PAIN: 0

## 2018-06-22 LAB
HEPATITIS C IGG: NORMAL
SIGNAL/CUTOFF: NORMAL

## 2024-02-01 ENCOUNTER — TELEPHONE (OUTPATIENT)
Dept: SURGERY | Age: 69
End: 2024-02-01

## 2024-02-01 ENCOUNTER — OFFICE VISIT (OUTPATIENT)
Dept: FAMILY MEDICINE CLINIC | Age: 69
End: 2024-02-01
Payer: MEDICARE

## 2024-02-01 VITALS
WEIGHT: 184 LBS | HEIGHT: 70 IN | DIASTOLIC BLOOD PRESSURE: 89 MMHG | OXYGEN SATURATION: 98 % | BODY MASS INDEX: 26.34 KG/M2 | SYSTOLIC BLOOD PRESSURE: 138 MMHG | HEART RATE: 53 BPM

## 2024-02-01 DIAGNOSIS — Z13.220 SCREENING FOR LIPID DISORDERS: ICD-10-CM

## 2024-02-01 DIAGNOSIS — Z12.5 PROSTATE CANCER SCREENING: ICD-10-CM

## 2024-02-01 DIAGNOSIS — K62.89 CYST OF PERIANAL AREA: Primary | ICD-10-CM

## 2024-02-01 DIAGNOSIS — Z12.11 COLON CANCER SCREENING: ICD-10-CM

## 2024-02-01 PROCEDURE — 99204 OFFICE O/P NEW MOD 45 MIN: CPT | Performed by: STUDENT IN AN ORGANIZED HEALTH CARE EDUCATION/TRAINING PROGRAM

## 2024-02-01 PROCEDURE — G8427 DOCREV CUR MEDS BY ELIG CLIN: HCPCS | Performed by: STUDENT IN AN ORGANIZED HEALTH CARE EDUCATION/TRAINING PROGRAM

## 2024-02-01 PROCEDURE — 1123F ACP DISCUSS/DSCN MKR DOCD: CPT | Performed by: STUDENT IN AN ORGANIZED HEALTH CARE EDUCATION/TRAINING PROGRAM

## 2024-02-01 PROCEDURE — 4004F PT TOBACCO SCREEN RCVD TLK: CPT | Performed by: STUDENT IN AN ORGANIZED HEALTH CARE EDUCATION/TRAINING PROGRAM

## 2024-02-01 PROCEDURE — G8419 CALC BMI OUT NRM PARAM NOF/U: HCPCS | Performed by: STUDENT IN AN ORGANIZED HEALTH CARE EDUCATION/TRAINING PROGRAM

## 2024-02-01 PROCEDURE — 3017F COLORECTAL CA SCREEN DOC REV: CPT | Performed by: STUDENT IN AN ORGANIZED HEALTH CARE EDUCATION/TRAINING PROGRAM

## 2024-02-01 PROCEDURE — 99204 OFFICE O/P NEW MOD 45 MIN: CPT

## 2024-02-01 PROCEDURE — G8484 FLU IMMUNIZE NO ADMIN: HCPCS | Performed by: STUDENT IN AN ORGANIZED HEALTH CARE EDUCATION/TRAINING PROGRAM

## 2024-02-01 RX ORDER — DOXYCYCLINE HYCLATE 100 MG
100 TABLET ORAL 2 TIMES DAILY
Qty: 20 TABLET | Refills: 0 | Status: SHIPPED | OUTPATIENT
Start: 2024-02-01 | End: 2024-02-11

## 2024-02-01 NOTE — PROGRESS NOTES
tablet 3     No current facility-administered medications for this visit.     Allergies   Allergen Reactions    Pcn [Penicillins] Hives       Health Maintenance   Topic Date Due    DTaP/Tdap/Td vaccine (1 - Tdap) Never done    Colorectal Cancer Screen  Never done    Shingles vaccine (1 of 2) Never done    Respiratory Syncytial Virus (RSV) Pregnant or age 60 yrs+ (1 - 1-dose 60+ series) Never done    Lipids  03/06/2019    Diabetes screen  03/06/2021    Pneumococcal 65+ years Vaccine (1 - PCV) Never done    AAA screen  Never done    Flu vaccine (1) Never done    COVID-19 Vaccine (3 - 2023-24 season) 09/01/2023    Annual Wellness Visit (Medicare)  Never done    Depression Screen  02/01/2025    Hepatitis C screen  Completed    Hepatitis A vaccine  Aged Out    Hepatitis B vaccine  Aged Out    Hib vaccine  Aged Out    Polio vaccine  Aged Out    Meningococcal (ACWY) vaccine  Aged Out       Subjective:      Review of Systems   Constitutional:  Negative for chills, fatigue and fever.   HENT:  Negative for ear pain, postnasal drip and trouble swallowing.    Eyes:  Negative for pain and visual disturbance.   Respiratory:  Negative for cough and shortness of breath.    Cardiovascular:  Negative for chest pain and palpitations.   Gastrointestinal:  Negative for abdominal pain, blood in stool, constipation, diarrhea, nausea and vomiting.   Genitourinary:  Negative for dysuria and urgency.   Skin:  Positive for wound. Negative for rash.   Neurological:  Negative for dizziness and headaches.   Psychiatric/Behavioral:  Negative for dysphoric mood. The patient is not nervous/anxious.        Objective:     Vitals:    02/01/24 1010 02/01/24 1038   BP: (!) 142/90 138/89   Site: Right Upper Arm    Position: Sitting    Cuff Size: Large Adult    Pulse: 53    SpO2: 98%    Weight: 83.5 kg (184 lb)    Height: 1.778 m (5' 10\")        Body mass index is 26.4 kg/m².    Wt Readings from Last 3 Encounters:   02/01/24 83.5 kg (184 lb)   06/19/18

## 2024-02-19 ENCOUNTER — HOSPITAL ENCOUNTER (OUTPATIENT)
Age: 69
Discharge: HOME | End: 2024-02-19
Payer: MEDICARE

## 2024-02-19 DIAGNOSIS — Z13.220 SCREENING FOR LIPID DISORDERS: ICD-10-CM

## 2024-02-19 DIAGNOSIS — K62.89 CYST OF PERIANAL AREA: ICD-10-CM

## 2024-02-19 DIAGNOSIS — Z12.5 PROSTATE CANCER SCREENING: ICD-10-CM

## 2024-02-19 LAB
ALBUMIN SERPL-MCNC: 4.2 G/DL (ref 3.5–5.2)
ALBUMIN/GLOB SERPL: 1.2 {RATIO} (ref 1–2.5)
ALP SERPL-CCNC: 70 U/L (ref 40–129)
ALT SERPL-CCNC: 17 U/L (ref 5–41)
ANION GAP SERPL CALCULATED.3IONS-SCNC: 10 MMOL/L (ref 9–17)
AST SERPL-CCNC: 19 U/L
BASOPHILS # BLD: 0.08 K/UL (ref 0–0.2)
BASOPHILS NFR BLD: 1 % (ref 0–2)
BILIRUB SERPL-MCNC: 1.6 MG/DL (ref 0.3–1.2)
BUN SERPL-MCNC: 16 MG/DL (ref 8–23)
BUN/CREAT SERPL: 20 (ref 9–20)
CALCIUM SERPL-MCNC: 9 MG/DL (ref 8.6–10.4)
CHLORIDE SERPL-SCNC: 103 MMOL/L (ref 98–107)
CHOLEST SERPL-MCNC: 185 MG/DL
CHOLESTEROL/HDL RATIO: 4.9
CO2 SERPL-SCNC: 27 MMOL/L (ref 20–31)
CREAT SERPL-MCNC: 0.8 MG/DL (ref 0.7–1.2)
EOSINOPHIL # BLD: 0.19 K/UL (ref 0–0.44)
EOSINOPHILS RELATIVE PERCENT: 3 % (ref 1–4)
ERYTHROCYTE [DISTWIDTH] IN BLOOD BY AUTOMATED COUNT: 13.3 % (ref 11.8–14.4)
GFR SERPL CREATININE-BSD FRML MDRD: >60 ML/MIN/1.73M2
GLUCOSE SERPL-MCNC: 92 MG/DL (ref 70–99)
HCT VFR BLD AUTO: 41 % (ref 40.7–50.3)
HDLC SERPL-MCNC: 38 MG/DL
HGB BLD-MCNC: 13.7 G/DL (ref 13–17)
IMM GRANULOCYTES # BLD AUTO: <0.03 K/UL (ref 0–0.3)
IMM GRANULOCYTES NFR BLD: 0 %
LDLC SERPL CALC-MCNC: 130 MG/DL (ref 0–130)
LYMPHOCYTES NFR BLD: 2.04 K/UL (ref 1.1–3.7)
LYMPHOCYTES RELATIVE PERCENT: 30 % (ref 24–43)
MCH RBC QN AUTO: 29.7 PG (ref 25.2–33.5)
MCHC RBC AUTO-ENTMCNC: 33.4 G/DL (ref 25.2–33.5)
MCV RBC AUTO: 88.9 FL (ref 82.6–102.9)
MONOCYTES NFR BLD: 0.65 K/UL (ref 0.1–1.2)
MONOCYTES NFR BLD: 10 % (ref 3–12)
NEUTROPHILS NFR BLD: 56 % (ref 36–65)
NEUTS SEG NFR BLD: 3.73 K/UL (ref 1.5–8.1)
NRBC BLD-RTO: 0 PER 100 WBC
PLATELET # BLD AUTO: 150 K/UL (ref 138–453)
PMV BLD AUTO: 9.6 FL (ref 8.1–13.5)
POTASSIUM SERPL-SCNC: 4.3 MMOL/L (ref 3.7–5.3)
PROT SERPL-MCNC: 7.7 G/DL (ref 6.4–8.3)
PSA SERPL-MCNC: 1.1 NG/ML (ref 0–4)
RBC # BLD AUTO: 4.61 M/UL (ref 4.21–5.77)
SODIUM SERPL-SCNC: 140 MMOL/L (ref 135–144)
TRIGL SERPL-MCNC: 86 MG/DL
WBC OTHER # BLD: 6.7 K/UL (ref 3.5–11.3)

## 2024-02-19 PROCEDURE — G0103 PSA SCREENING: HCPCS

## 2024-02-19 PROCEDURE — 85025 COMPLETE CBC W/AUTO DIFF WBC: CPT

## 2024-02-19 PROCEDURE — 36415 COLL VENOUS BLD VENIPUNCTURE: CPT

## 2024-02-19 PROCEDURE — 80053 COMPREHEN METABOLIC PANEL: CPT

## 2024-02-19 PROCEDURE — 80061 LIPID PANEL: CPT

## 2024-02-21 ENCOUNTER — TELEPHONE (OUTPATIENT)
Dept: SURGERY | Age: 69
End: 2024-02-21

## 2024-02-22 RX ORDER — BISACODYL 5 MG/1
TABLET, DELAYED RELEASE ORAL
Qty: 2 TABLET | Refills: 0 | Status: SHIPPED | OUTPATIENT
Start: 2024-02-22

## 2024-02-22 RX ORDER — POLYETHYLENE GLYCOL 3350, SODIUM SULFATE ANHYDROUS, SODIUM BICARBONATE, SODIUM CHLORIDE, POTASSIUM CHLORIDE 236; 22.74; 6.74; 5.86; 2.97 G/4L; G/4L; G/4L; G/4L; G/4L
POWDER, FOR SOLUTION ORAL
Qty: 4000 ML | Refills: 0 | Status: SHIPPED | OUTPATIENT
Start: 2024-02-22

## 2024-02-22 NOTE — TELEPHONE ENCOUNTER
Spoke with patient at this time and scheduled colonoscopy. Pre-surgery and bowel prep instructions went over with patient at this time and denies any questions. All medication holds reviewed and copy of instructions mailed to patient at this time. Acknowledges understanding of procedure and will call with any further questions. [facility] aware of scheduled procedure.       Colonoscopy:                     Screening-Yes       Diagnostic-No     Surgery Date: 3/14/24    Location: [place]    Pharmacy: [address]

## 2024-03-14 ENCOUNTER — ANESTHESIA (OUTPATIENT)
Dept: OPERATING ROOM | Age: 69
End: 2024-03-14
Payer: MEDICARE

## 2024-03-14 ENCOUNTER — HOSPITAL ENCOUNTER (OUTPATIENT)
Age: 69
Setting detail: OUTPATIENT SURGERY
Discharge: HOME | End: 2024-03-14
Attending: SURGERY | Admitting: SURGERY
Payer: MEDICARE

## 2024-03-14 ENCOUNTER — ANESTHESIA EVENT (OUTPATIENT)
Dept: OPERATING ROOM | Age: 69
End: 2024-03-14
Payer: MEDICARE

## 2024-03-14 VITALS
TEMPERATURE: 97 F | RESPIRATION RATE: 16 BRPM | HEART RATE: 60 BPM | WEIGHT: 184 LBS | DIASTOLIC BLOOD PRESSURE: 58 MMHG | OXYGEN SATURATION: 97 % | SYSTOLIC BLOOD PRESSURE: 107 MMHG | HEIGHT: 70 IN | BODY MASS INDEX: 26.34 KG/M2

## 2024-03-14 DIAGNOSIS — Z12.11 COLON CANCER SCREENING: ICD-10-CM

## 2024-03-14 PROCEDURE — 2580000003 HC RX 258: Performed by: SURGERY

## 2024-03-14 PROCEDURE — 3700000000 HC ANESTHESIA ATTENDED CARE: Performed by: SURGERY

## 2024-03-14 PROCEDURE — 3609010400 HC COLONOSCOPY POLYPECTOMY HOT BIOPSY: Performed by: SURGERY

## 2024-03-14 PROCEDURE — 2709999900 HC NON-CHARGEABLE SUPPLY: Performed by: SURGERY

## 2024-03-14 PROCEDURE — 7100000011 HC PHASE II RECOVERY - ADDTL 15 MIN: Performed by: SURGERY

## 2024-03-14 PROCEDURE — 7100000010 HC PHASE II RECOVERY - FIRST 15 MIN: Performed by: SURGERY

## 2024-03-14 PROCEDURE — 88305 TISSUE EXAM BY PATHOLOGIST: CPT

## 2024-03-14 PROCEDURE — 3700000001 HC ADD 15 MINUTES (ANESTHESIA): Performed by: SURGERY

## 2024-03-14 PROCEDURE — 6360000002 HC RX W HCPCS: Performed by: NURSE ANESTHETIST, CERTIFIED REGISTERED

## 2024-03-14 RX ORDER — GLYCOPYRROLATE 0.2 MG/ML
INJECTION INTRAMUSCULAR; INTRAVENOUS PRN
Status: DISCONTINUED | OUTPATIENT
Start: 2024-03-14 | End: 2024-03-14 | Stop reason: SDUPTHER

## 2024-03-14 RX ORDER — SODIUM CHLORIDE 0.9 % (FLUSH) 0.9 %
5-40 SYRINGE (ML) INJECTION EVERY 12 HOURS SCHEDULED
Status: DISCONTINUED | OUTPATIENT
Start: 2024-03-14 | End: 2024-03-14 | Stop reason: HOSPADM

## 2024-03-14 RX ORDER — SODIUM CHLORIDE 9 MG/ML
INJECTION, SOLUTION INTRAVENOUS PRN
Status: DISCONTINUED | OUTPATIENT
Start: 2024-03-14 | End: 2024-03-14 | Stop reason: HOSPADM

## 2024-03-14 RX ORDER — SODIUM CHLORIDE 0.9 % (FLUSH) 0.9 %
5-40 SYRINGE (ML) INJECTION PRN
Status: DISCONTINUED | OUTPATIENT
Start: 2024-03-14 | End: 2024-03-14 | Stop reason: HOSPADM

## 2024-03-14 RX ORDER — SODIUM CHLORIDE, SODIUM LACTATE, POTASSIUM CHLORIDE, CALCIUM CHLORIDE 600; 310; 30; 20 MG/100ML; MG/100ML; MG/100ML; MG/100ML
INJECTION, SOLUTION INTRAVENOUS CONTINUOUS
Status: DISCONTINUED | OUTPATIENT
Start: 2024-03-14 | End: 2024-03-14 | Stop reason: HOSPADM

## 2024-03-14 RX ORDER — PROPOFOL 10 MG/ML
INJECTION, EMULSION INTRAVENOUS PRN
Status: DISCONTINUED | OUTPATIENT
Start: 2024-03-14 | End: 2024-03-14 | Stop reason: SDUPTHER

## 2024-03-14 RX ADMIN — PROPOFOL 180 MCG/KG/MIN: 10 INJECTION, EMULSION INTRAVENOUS at 07:51

## 2024-03-14 RX ADMIN — SODIUM CHLORIDE, POTASSIUM CHLORIDE, SODIUM LACTATE AND CALCIUM CHLORIDE: 600; 310; 30; 20 INJECTION, SOLUTION INTRAVENOUS at 07:24

## 2024-03-14 RX ADMIN — PROPOFOL 80 MG: 10 INJECTION, EMULSION INTRAVENOUS at 07:49

## 2024-03-14 RX ADMIN — GLYCOPYRROLATE 0.2 MG: 0.2 INJECTION INTRAMUSCULAR; INTRAVENOUS at 07:53

## 2024-03-14 ASSESSMENT — PAIN - FUNCTIONAL ASSESSMENT: PAIN_FUNCTIONAL_ASSESSMENT: NONE - DENIES PAIN

## 2024-03-14 ASSESSMENT — PAIN SCALES - GENERAL
PAINLEVEL_OUTOF10: 0
PAINLEVEL_OUTOF10: 0

## 2024-03-14 NOTE — DISCHARGE INSTRUCTIONS
provider.   Don't share them with anyone else.   Know what effects and side effects to expect, and report them to your healthcare provider.   If you are taking more than one drug, even if it is an over-the-counter medication, herb, or dietary supplement, be sure to check with a physician or pharmacist about drug interactions.   Plan ahead for refills so you don't run out.   Lifestyle Changes    The results of your colonoscopy will determine if any lifestyle changes are necessary.   Follow-up   The doctor will usually give you a preliminary report after the medication wears off and you are more alert. The results from a biopsy can take as long as 1-2 weeks to be completed.   Schedule a follow-up appointment as directed by your doctor.    You should schedule a follow-up colonoscopy as recommended by your doctor.    Call Your Doctor If Any of the Following Occurs   Monitor your recovery once you leave the hospital. As soon as you have a problem, alert your doctor. If any of the following occur, call your doctor:   Bleeding from your rectum; notify your doctor if you pass a teaspoonful or more of blood   Black, tarry stools   Severe abdominal pain   Hard, swollen abdomen   Signs of infection, including fever or chills   Inability to pass gas or stool   Coughing, shortness of breath, chest pain, severe nausea or vomiting   In case of an emergency, call 911 immediately.

## 2024-03-14 NOTE — PROCEDURES
[facility]                [address] [place] [address]                               COLONOSCOPY      PATIENT NAME: NIDIA JEFFREY              : 1955  MED REC NO: [id number]                         ROOM: [facility]  ACCOUNT NO: [id number]                       ADMIT DATE: 2024  PROVIDER: Kermit Ruiz DO      DATE OF PROCEDURE:  2024    ASSISTANT:  None.    PREOPERATIVE DIAGNOSIS:  Screening.    POSTOPERATIVE DIAGNOSES:    1. Screening.  2. Colon polyps.  3. Internal hemorrhoids.    PROCEDURE:  Colonoscopy with hot snare and hot forceps polypectomies.    ANESTHESIA:  MAC.    ESTIMATED BLOOD LOSS:  Minimal.    FLUIDS:  Per Anesthesia record.    COMPLICATIONS:  None.    SPECIMENS:    1. Polyp in the cecum removed with hot forceps.  2. Polyp at 75 cm removed with hot snare.  3. Polyp at 40 cm removed with hot snare.    INDICATION FOR PROCEDURE:  The patient is a 68-year-old gentleman referred to my office for repeat screening colonoscopy.  After evaluation, decision was made to proceed.  Prior to the time of the procedure, risks, benefits, and alternatives were explained to the patient and consent was obtained.    DESCRIPTION OF PROCEDURE:  The patient was brought to the endoscopy suite, kept on preop gurney, placed in the left lateral decubitus position.  Monitoring devices were placed.  MAC anesthesia was induced.  After induction of anesthesia, a time-out was performed, the correct patient and procedure were verified.  Digital rectal exam was performed, which showed no abnormalities.  The Olympus video endoscope was lubricated, inserted in the patient's rectum, which was gently insufflated with air.  Scope was then slowly advanced to the colon under visualization at the level of cecum, which was identified by appendiceal orifice and ileocecal valve.  During advancement of the scope, bowel prep was adequate.  Scope was withdrawn through the  colon with the withdrawal time being greater than 7 minutes.  During this time, colonic mucosa was carefully inspected.  There were a few polyps starting with a small one in the cecum and then a few in the distal colon.  These were removed with combination of hot snare and hot forceps techniques and sent as separate specimens.  No other findings in the colon.  Once in the rectum, retroflexed view showed internal hemorrhoids, but no other abnormalities.  The scope was then straightened and removed, and procedure was concluded.  At conclusion of the procedure, the patient was in stable condition, was taken to PACU for recovery.    PLAN:  We will follow up results of pathology and make final recommendations at that time.          LIZ DYER DO      D:  03/14/2024 08:17:31     T:  03/14/2024 08:43:02     VIRGIE/TANVI  Job #:  [id number]     Doc#:  [id number]    CC:   Primary Care

## 2024-03-14 NOTE — ANESTHESIA PRE PROCEDURE
\"BEART\", \"[id number]\"     Type & Screen (If Applicable):  No results found for: \"LABABO\", \"LABRH\"    Drug/Infectious Status (If Applicable):  No results found for: \"HIV\", \"HEPCAB\"    COVID-19 Screening (If Applicable): No results found for: \"COVID19\"        Anesthesia Evaluation  Patient summary reviewed  Airway: Mallampati: II          Dental:          Pulmonary:                              Cardiovascular:                      Neuro/Psych:   (+) psychiatric history:            GI/Hepatic/Renal:             Endo/Other:                     Abdominal:             Vascular:          Other Findings:       Anesthesia Plan      general and TIVA     ASA 2       Induction: intravenous.      Anesthetic plan and risks discussed with patient.                    Yoan Dempsey, KARLENE - CRNA   3/14/2024

## 2024-03-14 NOTE — H&P
Subjective   Santo Loving is a 68 y.o. male who presents today for repeat screening colonoscopy.  Last was over 10 years ago.  Denies any recent changes in bowel movements, blood per rectum, melena or unintended weight loss.  No reported family hx of colon cancer.    Past Medical History:   Diagnosis Date    Hearing loss        Past Surgical History:   Procedure Laterality Date    FINGER SURGERY Left 2015       No current facility-administered medications for this encounter.     Current Outpatient Medications   Medication Sig Dispense Refill    polyethylene glycol (GOLYTELY) 236 g solution Mix as directed. At 4pm the day prior to your procedure, drink an 8oz glass every 10 minutes until gone. 4000 mL 0    bisacodyl 5 MG EC tablet Take 2 tabs by mouth at noon the day prior to your procedure. 2 tablet 0       Allergies   Allergen Reactions    Pcn [Penicillins] Hives       Family History   Problem Relation Age of Onset    Cancer Father         lung       Social History     Socioeconomic History    Marital status:      Spouse name: Not on file    Number of children: Not on file    Years of education: Not on file    Highest education level: Not on file   Occupational History    Not on file   Tobacco Use    Smoking status: Former     Current packs/day: 0.00     Types: Cigarettes     Quit date: 1987     Years since quittin.9    Smokeless tobacco: Never   Substance and Sexual Activity    Alcohol use: Yes     Comment: occasionally    Drug use: No    Sexual activity: Not on file   Other Topics Concern    Not on file   Social History Narrative    Not on file     Social Determinants of Health     Financial Resource Strain: Low Risk  (2024)    Overall Financial Resource Strain (CARDIA)     Difficulty of Paying Living Expenses: Not hard at all   Food Insecurity: No Food Insecurity (2024)    Hunger Vital Sign     Worried About Running Out of Food in the Last Year: Never true     Ran Out of Food in  the Last Year: Never true   Transportation Needs: Unknown (2/1/2024)    PRAPARE - Transportation     Lack of Transportation (Medical): Not on file     Lack of Transportation (Non-Medical): No   Physical Activity: Not on file   Stress: Not on file   Social Connections: Not on file   Intimate Partner Violence: Not on file   Housing Stability: Unknown (2/1/2024)    Housing Stability Vital Sign     Unable to Pay for Housing in the Last Year: Not on file     Number of Places Lived in the Last Year: Not on file     Unstable Housing in the Last Year: No       ROS:   Review of Systems - Negative except as noted in HPI      Objective   There were no vitals filed for this visit.  General:in no apparent distress  Eyes: No gross abnormalities.  Ears, Nose, Throat: hearing grossly normal bilaterally  Neck: neck supple and non tender without mass  Lungs: clear to auscultation without wheezes or rales   Heart: S1S2, no mumurs, RRR  Abdomen: soft, nontender, no HSM, no guarding, no rebound, no masses  Extremity: negative  Neuro: CN II-XII grossly intact      Assessment     1. Screening colonoscopy      Plan     1. Proceed as planned    Electronically signed by Kermit Ruiz DO on 3/13/2024 at 8:08 PM      (Please note that portions of this note were completed with a voice recognition program.  Efforts were made to edit the dictations but occasionally words are mis-transcribed.)

## 2024-03-14 NOTE — ANESTHESIA POSTPROCEDURE EVALUATION
Department of Anesthesiology  Postprocedure Note    Patient: Santo Loving  MRN: [id number]  YOB: 1955  Date of evaluation: 3/14/2024    Procedure Summary       Date: 03/14/24 Room / Location: [facility]    Anesthesia Start: 0748 Anesthesia Stop: 0815    Procedure: COLONOSCOPY POLYPECTOMY HOT BIOPSY/SNARE (Anus) Diagnosis:       Colon cancer screening      (Colon cancer screening [Z12.11])    Surgeons: Kermit Ruiz DO Responsible Provider: Yoan Dempsey APRN - CRNA    Anesthesia Type: General, TIVA ASA Status: 2            Anesthesia Type: General, TIVA    Malka Phase I: Malka Score: 10    Malka Phase II:      Anesthesia Post Evaluation    Patient location during evaluation: bedside  Level of consciousness: sleepy but conscious  Airway patency: patent  Nausea & Vomiting: no nausea and no vomiting  Cardiovascular status: hemodynamically stable  Respiratory status: spontaneous ventilation  Hydration status: stable  Pain management: satisfactory to patient    No notable events documented.

## 2024-03-18 LAB — SURGICAL PATHOLOGY REPORT: NORMAL

## 2024-03-20 PROBLEM — Z12.11 COLON CANCER SCREENING: Status: ACTIVE | Noted: 2024-03-20

## 2024-04-19 PROBLEM — Z12.11 COLON CANCER SCREENING: Status: RESOLVED | Noted: 2024-03-20 | Resolved: 2024-04-19

## 2024-10-30 ENCOUNTER — TELEPHONE (OUTPATIENT)
Dept: FAMILY MEDICINE CLINIC | Age: 69
End: 2024-10-30

## 2024-10-30 NOTE — TELEPHONE ENCOUNTER
LM for patient to schedule future appt.    PATIENT NEEDS TO BE SCHEDULED AS A MEDICARE WELLNESS VISIT

## 2025-01-15 NOTE — TELEPHONE ENCOUNTER
Patient called stating he was returning a phone call to Jaleel, to schedule his colonoscopy. Can contact patient at # [phone].   well developed, well nourished , in no acute distress , ambulating without difficulty , normal communication ability

## (undated) DEVICE — TRAP SURG QUAD PARABOLA SLOT DSGN SFTY SCRN TRAPEASE

## (undated) DEVICE — CO2 CANNULA,SSOFT,ADLT,7O2,4CO2,FEMALE: Brand: MEDLINE

## (undated) DEVICE — SNARE ENDOSCP L240CM SHTH DIA2.4MM LOOP W20MM MIN WRK CHN

## (undated) DEVICE — 4-PORT MANIFOLD: Brand: NEPTUNE 2

## (undated) DEVICE — PAD, GROUNDING, UNIVERSAL, SPLIT, 9': Brand: MEDLINE

## (undated) DEVICE — MERCY DEFIANCE ENDO KIT: Brand: MEDLINE INDUSTRIES, INC.

## (undated) DEVICE — FORCEPS BX L240CM JAW DIA2.2MM RAD JAW 4 HOT DISP